# Patient Record
Sex: MALE | Race: WHITE | NOT HISPANIC OR LATINO | Employment: OTHER | URBAN - METROPOLITAN AREA
[De-identification: names, ages, dates, MRNs, and addresses within clinical notes are randomized per-mention and may not be internally consistent; named-entity substitution may affect disease eponyms.]

---

## 2024-11-07 ENCOUNTER — OFFICE VISIT (OUTPATIENT)
Dept: PRIMARY CARE | Facility: CLINIC | Age: 59
End: 2024-11-07
Payer: MEDICAID

## 2024-11-07 VITALS
HEIGHT: 75 IN | SYSTOLIC BLOOD PRESSURE: 144 MMHG | WEIGHT: 315 LBS | BODY MASS INDEX: 39.17 KG/M2 | DIASTOLIC BLOOD PRESSURE: 82 MMHG

## 2024-11-07 DIAGNOSIS — M25.512 LEFT SHOULDER PAIN, UNSPECIFIED CHRONICITY: ICD-10-CM

## 2024-11-07 DIAGNOSIS — R53.83 OTHER FATIGUE: ICD-10-CM

## 2024-11-07 DIAGNOSIS — E78.49 OTHER HYPERLIPIDEMIA: ICD-10-CM

## 2024-11-07 DIAGNOSIS — E13.9 OTHER SPECIFIED DIABETES MELLITUS WITHOUT COMPLICATION, WITHOUT LONG-TERM CURRENT USE OF INSULIN: ICD-10-CM

## 2024-11-07 PROBLEM — E80.4 GILBERT'S SYNDROME: Status: ACTIVE | Noted: 2024-11-07

## 2024-11-07 PROBLEM — M10.9 GOUT: Status: ACTIVE | Noted: 2024-11-07

## 2024-11-07 PROBLEM — E55.9 VITAMIN D DEFICIENCY: Status: ACTIVE | Noted: 2024-11-07

## 2024-11-07 PROBLEM — S72.051A: Status: ACTIVE | Noted: 2024-11-07

## 2024-11-07 PROBLEM — M25.511 PAIN OF BOTH SHOULDER JOINTS: Status: ACTIVE | Noted: 2024-11-07

## 2024-11-07 PROBLEM — R26.2 DIFFICULTY WALKING: Status: ACTIVE | Noted: 2024-11-07

## 2024-11-07 PROBLEM — N20.0 CALCULUS OF KIDNEY: Status: ACTIVE | Noted: 2024-11-07

## 2024-11-07 PROBLEM — K76.0 NONALCOHOLIC FATTY LIVER: Status: ACTIVE | Noted: 2024-11-07

## 2024-11-07 PROBLEM — N20.0 NEPHROLITHIASIS: Status: ACTIVE | Noted: 2024-11-07

## 2024-11-07 PROBLEM — E66.9 OBESITY: Status: ACTIVE | Noted: 2024-11-07

## 2024-11-07 PROBLEM — M25.651 STIFFNESS OF RIGHT HIP JOINT: Status: ACTIVE | Noted: 2024-11-07

## 2024-11-07 PROBLEM — Z96.649 PRESENCE OF HIP JOINT PROSTHESIS: Status: ACTIVE | Noted: 2024-11-07

## 2024-11-07 PROBLEM — R79.89 ABNORMAL LIVER FUNCTION TESTS: Status: ACTIVE | Noted: 2024-11-07

## 2024-11-07 PROBLEM — E11.9 DIABETES MELLITUS (MULTI): Status: ACTIVE | Noted: 2024-11-07

## 2024-11-07 PROBLEM — I10 ESSENTIAL (PRIMARY) HYPERTENSION: Status: ACTIVE | Noted: 2024-11-07

## 2024-11-07 PROBLEM — M16.32 OSTEOARTHRITIS RESULTING FROM LEFT HIP DYSPLASIA: Status: ACTIVE | Noted: 2024-11-07

## 2024-11-07 PROBLEM — E78.5 HYPERLIPIDEMIA: Status: ACTIVE | Noted: 2024-11-07

## 2024-11-07 PROBLEM — K76.0 FATTY (CHANGE OF) LIVER, NOT ELSEWHERE CLASSIFIED: Status: ACTIVE | Noted: 2024-11-07

## 2024-11-07 PROBLEM — Z77.012: Status: ACTIVE | Noted: 2024-11-07

## 2024-11-07 PROBLEM — M19.90 OSTEOARTHRITIS: Status: ACTIVE | Noted: 2024-11-07

## 2024-11-07 PROBLEM — R31.9 HEMATURIA, UNSPECIFIED: Status: ACTIVE | Noted: 2024-11-07

## 2024-11-07 PROBLEM — G89.29 CHRONIC LOW BACK PAIN: Status: ACTIVE | Noted: 2024-11-07

## 2024-11-07 PROBLEM — R29.898 WEAKNESS OF EXTREMITY: Status: ACTIVE | Noted: 2024-11-07

## 2024-11-07 PROBLEM — M17.11 PRIMARY OSTEOARTHRITIS OF RIGHT KNEE: Status: ACTIVE | Noted: 2024-11-07

## 2024-11-07 PROBLEM — M75.22 BICIPITAL TENDINITIS, LEFT SHOULDER: Status: ACTIVE | Noted: 2024-11-07

## 2024-11-07 PROBLEM — Z77.29 EXPOSURE TO POTENTIALLY HAZARDOUS SUBSTANCE: Status: ACTIVE | Noted: 2024-11-07

## 2024-11-07 PROBLEM — M54.50 CHRONIC LOW BACK PAIN: Status: ACTIVE | Noted: 2024-11-07

## 2024-11-07 PROBLEM — K21.9 GASTROESOPHAGEAL REFLUX DISEASE: Status: ACTIVE | Noted: 2024-11-07

## 2024-11-07 PROBLEM — S73.006A DISLOCATION OF HIP (MULTI): Status: ACTIVE | Noted: 2024-11-07

## 2024-11-07 PROBLEM — R31.1 BENIGN ESSENTIAL MICROSCOPIC HEMATURIA: Status: ACTIVE | Noted: 2024-11-07

## 2024-11-07 PROBLEM — M16.51 POST-TRAUMATIC OSTEOARTHRITIS OF RIGHT HIP: Status: ACTIVE | Noted: 2024-11-07

## 2024-11-07 PROCEDURE — 3008F BODY MASS INDEX DOCD: CPT | Performed by: INTERNAL MEDICINE

## 2024-11-07 PROCEDURE — 99204 OFFICE O/P NEW MOD 45 MIN: CPT | Performed by: INTERNAL MEDICINE

## 2024-11-07 PROCEDURE — 3077F SYST BP >= 140 MM HG: CPT | Performed by: INTERNAL MEDICINE

## 2024-11-07 PROCEDURE — 1036F TOBACCO NON-USER: CPT | Performed by: INTERNAL MEDICINE

## 2024-11-07 PROCEDURE — 3079F DIAST BP 80-89 MM HG: CPT | Performed by: INTERNAL MEDICINE

## 2024-11-07 RX ORDER — CYCLOBENZAPRINE HCL 10 MG
10 TABLET ORAL NIGHTLY PRN
Qty: 30 TABLET | Refills: 2 | Status: SHIPPED | OUTPATIENT
Start: 2024-11-07 | End: 2025-07-05

## 2024-11-07 RX ORDER — NABUMETONE 750 MG/1
750 TABLET, FILM COATED ORAL 2 TIMES DAILY
Qty: 60 TABLET | Refills: 2 | Status: SHIPPED | OUTPATIENT
Start: 2024-11-07 | End: 2025-11-07

## 2024-11-08 NOTE — PROGRESS NOTES
"Subjective   Patient ID: kinga Degroot is a 59 y.o. male who presents for New Patient Visit.    This is a 59-year-old gentleman today came here for multiple medical issues.  1. Severe left shoulder pain not improved going on for the last several days.  2. He snores at night.  Increased weight gain, he is concerned.  3. Feeling tired, fatigued, and exhausted.    I have personally reviewed the patient's Past Medical History, Medications, Allergies, Social History, and Family History in the EMR.    OPERATIONS:  He had a hip surgery and shoulder surgery.    IMMUNIZATION:  Tetanus within the last 10 years.    Review of Systems   All other systems reviewed and are negative.  The patient has never had a heart attack.  No stroke.  No diabetes.  No cancer.     Objective   /82   Ht 1.905 m (6' 3\")   Wt (!) 151 kg (332 lb)   BMI 41.50 kg/m²     Physical Exam  Vitals reviewed.   HENT:      Right Ear: Tympanic membrane, ear canal and external ear normal.      Left Ear: Tympanic membrane, ear canal and external ear normal.   Eyes:      General: No scleral icterus.     Pupils: Pupils are equal, round, and reactive to light.   Neck:      Vascular: No carotid bruit.   Cardiovascular:      Heart sounds: Normal heart sounds, S1 normal and S2 normal. No murmur heard.     No friction rub.   Pulmonary:      Effort: Pulmonary effort is normal.      Breath sounds: Normal breath sounds and air entry.   Abdominal:      Palpations: There is no hepatomegaly, splenomegaly or mass.   Genitourinary:     Comments: RECTAL:  Deferred by the patient.  GENITAL:  Deferred by the patient.  Musculoskeletal:         General: No swelling or deformity. Normal range of motion.      Cervical back: Neck supple. Pain with movement present.      Right lower leg: No edema.      Left lower leg: No edema.      Comments: Shoulder swelling and tenderness.     Lymphadenopathy:      Cervical: No cervical adenopathy.      Upper Body:      Right upper body: No " axillary adenopathy.      Left upper body: No axillary adenopathy.      Lower Body: No right inguinal adenopathy. No left inguinal adenopathy.   Neurological:      Mental Status: He is oriented to person, place, and time.      Cranial Nerves: Cranial nerves 2-12 are intact. No cranial nerve deficit.      Sensory: No sensory deficit.      Motor: Motor function is intact. No weakness.      Gait: Gait is intact.      Deep Tendon Reflexes: Reflexes normal.   Psychiatric:         Mood and Affect: Mood normal. Mood is not anxious or depressed. Affect is not angry.         Behavior: Behavior is not agitated.         Thought Content: Thought content normal.         Judgment: Judgment normal.     LAB WORK:  Laboratory testing discussed.    Assessment/Plan   Problem List Items Addressed This Visit             ICD-10-CM       Cardiac and Vasculature    Hyperlipidemia E78.5    Relevant Orders    Comprehensive Metabolic Panel    Lipid Panel       Endocrine/Metabolic    Diabetes mellitus (Multi) E11.9    Relevant Orders    Hemoglobin A1C       Musculoskeletal and Injuries    Pain in left shoulder M25.512    Relevant Medications    nabumetone (Relafen) 750 mg tablet    cyclobenzaprine (Flexeril) 10 mg tablet    Other Relevant Orders    XR shoulder left 2+ views    Referral to Orthopaedic Surgery    Referral to Physical Therapy     Other Visit Diagnoses         Codes    Other fatigue     R53.83    Relevant Orders    CBC    Urinalysis with Reflex Microscopic    Thyroid Stimulating Hormone        1. Left shoulder pain.  I ordered x-ray of the shoulder.  Relafen, Flexeril and therapy.  Refer to Dr. Nichole.  2. Snoring.  Sleep apnea test is must.  Sleep test ordered.  3. Weight gain, tired, fatigued, and exhausted.  Complete blood work ordered.  4. I shall see him in a week after test.    Scribe Attestation  By signing my name below, I, Ashley Hernandez, Jacob attest that this documentation has been prepared under the direction and in the  presence of Dahiana Chester MD.

## 2024-11-11 ENCOUNTER — LAB (OUTPATIENT)
Dept: LAB | Facility: LAB | Age: 59
End: 2024-11-11
Payer: MEDICAID

## 2024-11-11 ENCOUNTER — HOSPITAL ENCOUNTER (OUTPATIENT)
Dept: RADIOLOGY | Facility: CLINIC | Age: 59
Discharge: HOME | End: 2024-11-11
Payer: MEDICAID

## 2024-11-11 DIAGNOSIS — E13.9 OTHER SPECIFIED DIABETES MELLITUS WITHOUT COMPLICATION, WITHOUT LONG-TERM CURRENT USE OF INSULIN: ICD-10-CM

## 2024-11-11 DIAGNOSIS — R53.83 OTHER FATIGUE: ICD-10-CM

## 2024-11-11 DIAGNOSIS — M25.512 LEFT SHOULDER PAIN, UNSPECIFIED CHRONICITY: ICD-10-CM

## 2024-11-11 DIAGNOSIS — E78.49 OTHER HYPERLIPIDEMIA: ICD-10-CM

## 2024-11-11 LAB
ALBUMIN SERPL BCP-MCNC: 4.5 G/DL (ref 3.4–5)
ALP SERPL-CCNC: 74 U/L (ref 33–120)
ALT SERPL W P-5'-P-CCNC: 61 U/L (ref 10–52)
ANION GAP SERPL CALC-SCNC: 14 MMOL/L (ref 10–20)
AST SERPL W P-5'-P-CCNC: 34 U/L (ref 9–39)
BILIRUB SERPL-MCNC: 1.1 MG/DL (ref 0–1.2)
BUN SERPL-MCNC: 17 MG/DL (ref 6–23)
CALCIUM SERPL-MCNC: 9.3 MG/DL (ref 8.6–10.6)
CHLORIDE SERPL-SCNC: 99 MMOL/L (ref 98–107)
CHOLEST SERPL-MCNC: 173 MG/DL (ref 0–199)
CHOLESTEROL/HDL RATIO: 4.8
CO2 SERPL-SCNC: 29 MMOL/L (ref 21–32)
CREAT SERPL-MCNC: 0.83 MG/DL (ref 0.5–1.3)
EGFRCR SERPLBLD CKD-EPI 2021: >90 ML/MIN/1.73M*2
ERYTHROCYTE [DISTWIDTH] IN BLOOD BY AUTOMATED COUNT: 12.1 % (ref 11.5–14.5)
EST. AVERAGE GLUCOSE BLD GHB EST-MCNC: 229 MG/DL
GLUCOSE SERPL-MCNC: 250 MG/DL (ref 74–99)
HBA1C MFR BLD: 9.6 %
HCT VFR BLD AUTO: 47.2 % (ref 41–52)
HDLC SERPL-MCNC: 36 MG/DL
HGB BLD-MCNC: 16.8 G/DL (ref 13.5–17.5)
LDLC SERPL CALC-MCNC: 107 MG/DL
MCH RBC QN AUTO: 32.2 PG (ref 26–34)
MCHC RBC AUTO-ENTMCNC: 35.6 G/DL (ref 32–36)
MCV RBC AUTO: 90 FL (ref 80–100)
NON HDL CHOLESTEROL: 137 MG/DL (ref 0–149)
NRBC BLD-RTO: 0 /100 WBCS (ref 0–0)
PLATELET # BLD AUTO: 208 X10*3/UL (ref 150–450)
POTASSIUM SERPL-SCNC: 4.3 MMOL/L (ref 3.5–5.3)
PROT SERPL-MCNC: 7.7 G/DL (ref 6.4–8.2)
RBC # BLD AUTO: 5.22 X10*6/UL (ref 4.5–5.9)
SODIUM SERPL-SCNC: 138 MMOL/L (ref 136–145)
TRIGL SERPL-MCNC: 148 MG/DL (ref 0–149)
TSH SERPL-ACNC: 1.7 MIU/L (ref 0.44–3.98)
VLDL: 30 MG/DL (ref 0–40)
WBC # BLD AUTO: 8 X10*3/UL (ref 4.4–11.3)

## 2024-11-11 PROCEDURE — 80061 LIPID PANEL: CPT

## 2024-11-11 PROCEDURE — 83036 HEMOGLOBIN GLYCOSYLATED A1C: CPT

## 2024-11-11 PROCEDURE — 73030 X-RAY EXAM OF SHOULDER: CPT | Mod: LT

## 2024-11-11 PROCEDURE — 84443 ASSAY THYROID STIM HORMONE: CPT

## 2024-11-11 PROCEDURE — 73030 X-RAY EXAM OF SHOULDER: CPT | Mod: LEFT SIDE | Performed by: STUDENT IN AN ORGANIZED HEALTH CARE EDUCATION/TRAINING PROGRAM

## 2024-11-11 PROCEDURE — 80053 COMPREHEN METABOLIC PANEL: CPT

## 2024-11-11 PROCEDURE — 85027 COMPLETE CBC AUTOMATED: CPT

## 2024-11-11 PROCEDURE — 36415 COLL VENOUS BLD VENIPUNCTURE: CPT

## 2024-11-11 PROCEDURE — 81001 URINALYSIS AUTO W/SCOPE: CPT

## 2024-11-12 LAB
APPEARANCE UR: CLEAR
BILIRUB UR STRIP.AUTO-MCNC: NEGATIVE MG/DL
COLOR UR: YELLOW
GLUCOSE UR STRIP.AUTO-MCNC: ABNORMAL MG/DL
HYALINE CASTS #/AREA URNS AUTO: ABNORMAL /LPF
KETONES UR STRIP.AUTO-MCNC: NEGATIVE MG/DL
LEUKOCYTE ESTERASE UR QL STRIP.AUTO: NEGATIVE
MUCOUS THREADS #/AREA URNS AUTO: ABNORMAL /LPF
NITRITE UR QL STRIP.AUTO: NEGATIVE
PH UR STRIP.AUTO: 6.5 [PH]
PROT UR STRIP.AUTO-MCNC: ABNORMAL MG/DL
RBC # UR STRIP.AUTO: ABNORMAL /UL
RBC #/AREA URNS AUTO: ABNORMAL /HPF
SP GR UR STRIP.AUTO: 1.02
UROBILINOGEN UR STRIP.AUTO-MCNC: NORMAL MG/DL
WBC #/AREA URNS AUTO: ABNORMAL /HPF

## 2024-11-25 ENCOUNTER — APPOINTMENT (OUTPATIENT)
Dept: ORTHOPEDIC SURGERY | Facility: CLINIC | Age: 59
End: 2024-11-25
Payer: MEDICAID

## 2024-11-25 DIAGNOSIS — M25.512 LEFT SHOULDER PAIN, UNSPECIFIED CHRONICITY: ICD-10-CM

## 2024-11-25 DIAGNOSIS — M19.012 OSTEOARTHRITIS OF LEFT SHOULDER, UNSPECIFIED OSTEOARTHRITIS TYPE: Primary | ICD-10-CM

## 2024-11-25 PROCEDURE — 3046F HEMOGLOBIN A1C LEVEL >9.0%: CPT | Performed by: ORTHOPAEDIC SURGERY

## 2024-11-25 PROCEDURE — 1036F TOBACCO NON-USER: CPT | Performed by: ORTHOPAEDIC SURGERY

## 2024-11-25 PROCEDURE — 20611 DRAIN/INJ JOINT/BURSA W/US: CPT | Performed by: ORTHOPAEDIC SURGERY

## 2024-11-25 PROCEDURE — 3049F LDL-C 100-129 MG/DL: CPT | Performed by: ORTHOPAEDIC SURGERY

## 2024-11-25 PROCEDURE — 99203 OFFICE O/P NEW LOW 30 MIN: CPT | Performed by: ORTHOPAEDIC SURGERY

## 2024-11-25 ASSESSMENT — ENCOUNTER SYMPTOMS
FATIGUE: 0
ARTHRALGIAS: 1
BRUISES/BLEEDS EASILY: 0
WHEEZING: 0
NUMBNESS: 0
SHORTNESS OF BREATH: 0
FEVER: 0
CHILLS: 0

## 2024-11-25 ASSESSMENT — PAIN SCALES - GENERAL: PAINLEVEL_OUTOF10: 7

## 2024-11-25 ASSESSMENT — PAIN - FUNCTIONAL ASSESSMENT: PAIN_FUNCTIONAL_ASSESSMENT: 0-10

## 2024-11-25 NOTE — PROGRESS NOTES
Reason for Appointment  Chief Complaint   Patient presents with    Left Shoulder - Pain     History of Present Illness  New patient is a 59 y.o. male here today for evaluation of left shoulder pain. PMHx includes DM, HLD. Went to his PCP on 11/7/24 for severe left shoulder pain. X-rays of the left shoulder on 11/11/24 were reviewed and showed moderate osteoarthritis, well centered small inferior spur. Today he states that his symptoms started a couple of years ago. His right shoulder is starting to have the same symptoms as the left. He says over 20 years ago he fell and broke his radial head and has had decreased hand and elbow ROM. He has had his elbow fixed. He reports no numbness or tingling. Most of the time does not hurt when he is using it. Pain bothers him at night and wakes him up. Has had an injection in the biceps 5-6 months ago, helped for 5 weeks. He does get pain that radiates to the biceps. Past medical history allergies medications social and family history all reviewed.       History reviewed. No pertinent past medical history.    History reviewed. No pertinent surgical history.    Medication Documentation Review Audit       Reviewed by Aurelia Gramajo MA (Medical Assistant) on 11/25/24 at 1511      Medication Order Taking? Sig Documenting Provider Last Dose Status   cyclobenzaprine (Flexeril) 10 mg tablet [id number] Yes Take 1 tablet (10 mg) by mouth as needed at bedtime for muscle spasms. Dahiana Chester MD  Active   nabumetone (Relafen) 750 mg tablet [id number] Yes Take 1 tablet (750 mg) by mouth 2 times a day. Dahiana Chester MD  Active                    No Known Allergies    Review of Systems   Constitutional:  Negative for chills, fatigue and fever.   Respiratory:  Negative for shortness of breath and wheezing.    Cardiovascular:  Negative for chest pain and leg swelling.   Musculoskeletal:  Positive for arthralgias.   Allergic/Immunologic: Negative for immunocompromised state.  "  Neurological:  Negative for numbness.   Hematological:  Does not bruise/bleed easily.       Exam   Excellent cervical ROM. No bony tenderness. Lacks 10 degrees of forward flexion. Lacks 30 degrees of external rotation. Excellent cuff strength in internal and external rotation. Large scar over the radial head. Lacks 15 degrees of full extension. Lacks 45 degrees of supination, full pronation. Good biceps triceps flexion extension strength. Good deltoid function. Tender over the joint line.. Mild arthritic changes in the distal joints. No contracture. Good pulses and sensation. Negative Dickinson's sign. No hyperreflexia. No skin changes. Good capillary refill.    Assessment   Encounter Diagnosis   Name Primary?    Left shoulder pain, unspecified chronicity    Osteoarthritis, left shoulder  Elbow arthritis, previous radial head fracture    Plan     Today we discussed conservative treatment. At this point, the patient is experiencing increased left shoulder pain that is consistent with osteoarthritis on clinical examination and X-ray with tenderness over the joint line. We will do one cortisone injection into the left shoulder joint in hopes to calm their symptoms nicely. Pt understands the small risk of infection and warning signs including flare reaction.     Patient ID: Tristan Degroot \"deep" is a 59 y.o. male.    L Inj/Asp: L glenohumeral on 11/25/2024 4:06 PM  Indications: pain  Details: 22 G needle, ultrasound-guided  Medications: 40 mg triamcinolone acetonide 40 mg/mL; 3 mL lidocaine 10 mg/mL (1 %)  Outcome: tolerated well, no immediate complications    After discussing the risks and benefits of the procedure we proceeded with the injection. Using ultrasound guidance we anteriorly identified the coracoid process, glenoid and humeral head, also identified the glenohumeral head joint space, images obtained.  We sterilely injected a mixture of 40 mg of Kenalog and 1 cc of 1% lidocaine into the left shoulder joint. Pt " tolerated the procedure well without any adverse effects.    Procedure, treatment alternatives, risks and benefits explained, specific risks discussed. Consent was given by the patient. Immediately prior to procedure a time out was called to verify the correct patient, procedure, equipment, support staff and site/side marked as required. Patient was prepped and draped in the usual sterile fashion.         I, Elissa Neves, attest that this documentation has been prepared under the direction and in the presence of Travis Nichole MD.   By signing below, I, Travis Nichole MD, personally performed the services described in this documentation. All medical record entries made by the scribe were at my direction and in my presence. I have reviewed the chart and agree that the record reflects my personal performance and is accurate and complete.

## 2024-11-26 RX ORDER — TRIAMCINOLONE ACETONIDE 40 MG/ML
40 INJECTION, SUSPENSION INTRA-ARTICULAR; INTRAMUSCULAR
Status: COMPLETED | OUTPATIENT
Start: 2024-11-25 | End: 2024-11-25

## 2024-11-26 RX ORDER — LIDOCAINE HYDROCHLORIDE 10 MG/ML
3 INJECTION, SOLUTION INFILTRATION; PERINEURAL
Status: COMPLETED | OUTPATIENT
Start: 2024-11-25 | End: 2024-11-25

## 2025-02-17 ENCOUNTER — APPOINTMENT (OUTPATIENT)
Dept: ORTHOPEDIC SURGERY | Facility: CLINIC | Age: 60
End: 2025-02-17
Payer: MEDICAID

## 2025-02-17 DIAGNOSIS — M75.102 TEAR OF LEFT ROTATOR CUFF, UNSPECIFIED TEAR EXTENT, UNSPECIFIED WHETHER TRAUMATIC: ICD-10-CM

## 2025-02-17 DIAGNOSIS — M19.012 OSTEOARTHRITIS OF LEFT SHOULDER, UNSPECIFIED OSTEOARTHRITIS TYPE: Primary | ICD-10-CM

## 2025-02-17 PROCEDURE — 99213 OFFICE O/P EST LOW 20 MIN: CPT | Performed by: ORTHOPAEDIC SURGERY

## 2025-02-17 PROCEDURE — 20611 DRAIN/INJ JOINT/BURSA W/US: CPT | Performed by: ORTHOPAEDIC SURGERY

## 2025-02-17 PROCEDURE — 1036F TOBACCO NON-USER: CPT | Performed by: ORTHOPAEDIC SURGERY

## 2025-02-17 RX ORDER — TRIAMCINOLONE ACETONIDE 40 MG/ML
40 INJECTION, SUSPENSION INTRA-ARTICULAR; INTRAMUSCULAR
Status: COMPLETED | OUTPATIENT
Start: 2025-02-17 | End: 2025-02-17

## 2025-02-17 RX ORDER — IBUPROFEN 600 MG/1
600 TABLET ORAL DAILY
Qty: 30 TABLET | Refills: 0 | Status: SHIPPED | OUTPATIENT
Start: 2025-02-17 | End: 2025-03-19

## 2025-02-17 RX ORDER — LIDOCAINE HYDROCHLORIDE 10 MG/ML
3 INJECTION, SOLUTION INFILTRATION; PERINEURAL
Status: COMPLETED | OUTPATIENT
Start: 2025-02-17 | End: 2025-02-17

## 2025-02-17 RX ADMIN — LIDOCAINE HYDROCHLORIDE 3 ML: 10 INJECTION, SOLUTION INFILTRATION; PERINEURAL at 11:00

## 2025-02-17 RX ADMIN — TRIAMCINOLONE ACETONIDE 40 MG: 40 INJECTION, SUSPENSION INTRA-ARTICULAR; INTRAMUSCULAR at 11:00

## 2025-02-17 ASSESSMENT — ENCOUNTER SYMPTOMS
JOINT SWELLING: 0
FATIGUE: 0
WHEEZING: 0
ARTHRALGIAS: 1
SINUS PAIN: 0
FEVER: 0
CHILLS: 0
SHORTNESS OF BREATH: 0

## 2025-02-17 ASSESSMENT — PAIN - FUNCTIONAL ASSESSMENT: PAIN_FUNCTIONAL_ASSESSMENT: 0-10

## 2025-02-17 ASSESSMENT — PAIN SCALES - GENERAL: PAINLEVEL_OUTOF10: 6

## 2025-02-17 NOTE — PROGRESS NOTES
Reason for Appointment  Chief Complaint   Patient presents with    Left Shoulder - Pain     History of Present Illness  Patient is a 59 y.o. male here today for follow-up evaluation of recurrent left shoulder pain.  Previous x-rays have shown moderate glenohumeral joint arthritis.  He has had multiple injections, most recent injection into the left shoulder joint 3 months ago gave him about 6 weeks of relief.  He does think that the joint injection helped more than the biceps tendon sheath injection he had before that.  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  He is having recurrent deep shoulder pain, he feels crunching at times and he is doing more activity with the right shoulder and now is having increased right shoulder pain as well.  He has not had an MRI in some time on the shoulder.  No other changes in his past medical history, allergies, or medications.    History reviewed. No pertinent past medical history.    History reviewed. No pertinent surgical history.    Medication Documentation Review Audit       Reviewed by Aurelia Gramajo MA (Medical Assistant) on 02/17/25 at 0950      Medication Order Taking? Sig Documenting Provider Last Dose Status   cyclobenzaprine (Flexeril) 10 mg tablet [id number] Yes Take 1 tablet (10 mg) by mouth as needed at bedtime for muscle spasms. Dahiana Chester MD  Active   nabumetone (Relafen) 750 mg tablet [id number] Yes Take 1 tablet (750 mg) by mouth 2 times a day. Dahiana Chester MD  Active                    No Known Allergies    Review of Systems   Constitutional:  Negative for chills, fatigue and fever.   HENT:  Negative for mouth sores, sinus pain and tinnitus.    Respiratory:  Negative for shortness of breath and wheezing.    Cardiovascular:  Negative for chest pain and leg swelling.   Musculoskeletal:  Positive for arthralgias. Negative for joint swelling.   Skin:  Negative for pallor and rash.     Exam   On exam the left shoulder shows  "painful active forward flexion up to 140 degrees.  He does have good cuff strength with resisted external rotation but markedly positive impingement signs.  Tenderness anteriorly over the joint line and over the biceps tendon sheath.  Deltoid is functional.  Good pulses and sensation in the upper extremity.    Assessment   Encounter Diagnoses   Name Primary?    Tear of left rotator cuff, unspecified tear extent, unspecified whether traumatic     Osteoarthritis of left shoulder, unspecified osteoarthritis type Yes     Plan   We do long discussion with him today, at his younger age he would be a candidate for a primary shoulder replacement I would give him the best function but we we will need to be careful with too many steroid injections.  He has not had a recent MRI and we will get an MRI of the left shoulder to evaluate the rotator cuff as this would help us decide if he would be a good candidate for a primary shoulder replacement.  He is not able to proceed with any surgery over the next few months due to work constraints.  We sterilely injected under ultrasound guidance Kenalog and lidocaine into the left shoulder joint.  Patient understands the small risk of infection and the signs to look for as well as flare action.  Hopefully this gives him good relief.  He will follow-up with us in the spring after he gets his MRI to discuss further intervention at that point.  Patient ID: Tristan Degroot \"kinga\" is a 59 y.o. male.    L Inj/Asp: L glenohumeral on 2/17/2025 11:00 AM  Indications: pain  Details: 22 G needle, ultrasound-guided  Medications: 40 mg triamcinolone acetonide 40 mg/mL; 3 mL lidocaine 10 mg/mL (1 %)  Outcome: tolerated well, no immediate complications    Left glenohumeral joint was visualized by ultrasound to a posterior approach needle localized and injected into the glenohumeral joint easily images saved  Procedure, treatment alternatives, risks and benefits explained, specific risks discussed. Consent " was given by the patient. Immediately prior to procedure a time out was called to verify the correct patient, procedure, equipment, support staff and site/side marked as required. Patient was prepped and draped in the usual sterile fashion.         I, Dorcas Alford PA-C, am acting as a scribe and attest that this documentation has been prepared under the direction and in the presence of Travis Nichole MD.  By signing below, I, Travis Nichole MD, personally performed the services described in this documentation. All medical record entries made by the scribe were at my direction and in my presence. I have reviewed the chart and agree that the record reflects my personal performance and is accurate and complete.

## 2025-03-15 ENCOUNTER — HOSPITAL ENCOUNTER (OUTPATIENT)
Dept: RADIOLOGY | Facility: CLINIC | Age: 60
Discharge: HOME | End: 2025-03-15
Payer: MEDICAID

## 2025-03-15 DIAGNOSIS — M75.102 TEAR OF LEFT ROTATOR CUFF, UNSPECIFIED TEAR EXTENT, UNSPECIFIED WHETHER TRAUMATIC: ICD-10-CM

## 2025-03-15 PROCEDURE — 73221 MRI JOINT UPR EXTREM W/O DYE: CPT | Mod: LT

## 2025-03-31 ENCOUNTER — APPOINTMENT (OUTPATIENT)
Dept: ORTHOPEDIC SURGERY | Facility: CLINIC | Age: 60
End: 2025-03-31
Payer: MEDICAID

## 2025-03-31 DIAGNOSIS — M19.012 OSTEOARTHRITIS OF LEFT SHOULDER, UNSPECIFIED OSTEOARTHRITIS TYPE: Primary | ICD-10-CM

## 2025-03-31 PROCEDURE — 1036F TOBACCO NON-USER: CPT | Performed by: ORTHOPAEDIC SURGERY

## 2025-03-31 PROCEDURE — 99213 OFFICE O/P EST LOW 20 MIN: CPT | Performed by: ORTHOPAEDIC SURGERY

## 2025-03-31 ASSESSMENT — ENCOUNTER SYMPTOMS
BRUISES/BLEEDS EASILY: 0
FATIGUE: 0
CHILLS: 0
SHORTNESS OF BREATH: 0
FEVER: 0
WHEEZING: 0
ARTHRALGIAS: 1

## 2025-03-31 NOTE — PROGRESS NOTES
Reason for Appointment  No chief complaint on file.    History of Present Illness  Patient is a 59 y.o. male here today for follow-up evaluation of left shoulder MRI results. Previous x-rays have shown moderate glenohumeral joint arthritis. We last saw the patient on 2/17/25 for right shoulder pain and we gave a left shoulder joint injection and following up after he gets his MRI. MRI taken of the left shoulder on 3/15/25  was reviewed and showed advanced gh osteoarthritis, no big tear, Acromioclavicular osteoarthritis, there maybe a small area of osteonecrosis of the humeral head. Today he states the last injection gave him good relief. He is still benefiting from the injection. He has better rom. He can grab his wallet from his back pocket, he was not able to do this before. He is lifting about 275 lb in the gym. He is doing back work in the gym. No recent falls or injuries. No other changes in past medical history, allergies, or medications.        No past medical history on file.    No past surgical history on file.    Medication Documentation Review Audit       Reviewed by Travis Nichole MD (Physician) on 02/17/25 at 1229      Medication Order Taking? Sig Documenting Provider Last Dose Status   cyclobenzaprine (Flexeril) 10 mg tablet [id number] Yes Take 1 tablet (10 mg) by mouth as needed at bedtime for muscle spasms. Dahiana Chester MD  Active   ibuprofen 600 mg tablet [id number]  Take 1 tablet (600 mg) by mouth once daily. Dorcas Alford PA-C  Active   nabumetone (Relafen) 750 mg tablet [id number] Yes Take 1 tablet (750 mg) by mouth 2 times a day. Dahiana Chester MD  Active                    No Known Allergies    Review of Systems   Constitutional:  Negative for chills, fatigue and fever.   Respiratory:  Negative for shortness of breath and wheezing.    Cardiovascular:  Negative for chest pain and leg swelling.   Musculoskeletal:  Positive for arthralgias.   Allergic/Immunologic: Negative for  immunocompromised state.   Hematological:  Does not bruise/bleed easily.       Exam   Overall good/improved rom form the injection. Good cuff strength. Good deltoid function. Good pulses and sensation.     Assessment   Osteoarthritis of left shoulder     Plan     We discussed the only long term option would be a shoulder replacement and he would be a candidate for a bone spurring one. 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 We discussed repeat injections, but caution with his rotator cuff       I, Elissa Neves, attest that this documentation has been prepared under the direction and in the presence of Travis Nichole MD.   By signing below, I, Travis Nichole MD, personally performed the services described in this documentation. All medical record entries made by the scribe were at my direction and in my presence. I have reviewed the chart and agree that the record reflects my personal performance and is accurate and complete.